# Patient Record
Sex: MALE | Race: WHITE | URBAN - NONMETROPOLITAN AREA
[De-identification: names, ages, dates, MRNs, and addresses within clinical notes are randomized per-mention and may not be internally consistent; named-entity substitution may affect disease eponyms.]

---

## 2018-03-09 ENCOUNTER — HOSPITAL ENCOUNTER (EMERGENCY)
Facility: HOSPITAL | Age: 73
Discharge: ANOTHER HOSPITAL | End: 2018-03-09
Attending: PHYSICIAN ASSISTANT | Admitting: PHYSICIAN ASSISTANT
Payer: COMMERCIAL

## 2018-03-09 ENCOUNTER — APPOINTMENT (OUTPATIENT)
Dept: CT IMAGING | Facility: HOSPITAL | Age: 73
End: 2018-03-09
Attending: PHYSICIAN ASSISTANT
Payer: COMMERCIAL

## 2018-03-09 ENCOUNTER — APPOINTMENT (OUTPATIENT)
Dept: GENERAL RADIOLOGY | Facility: HOSPITAL | Age: 73
End: 2018-03-09
Attending: PHYSICIAN ASSISTANT
Payer: COMMERCIAL

## 2018-03-09 VITALS
RESPIRATION RATE: 40 BRPM | DIASTOLIC BLOOD PRESSURE: 92 MMHG | OXYGEN SATURATION: 91 % | TEMPERATURE: 98.8 F | WEIGHT: 240 LBS | SYSTOLIC BLOOD PRESSURE: 117 MMHG

## 2018-03-09 DIAGNOSIS — J62.8 SILICOSIS (H): ICD-10-CM

## 2018-03-09 DIAGNOSIS — J96.01 ACUTE RESPIRATORY FAILURE WITH HYPOXIA (H): ICD-10-CM

## 2018-03-09 LAB
ALBUMIN SERPL-MCNC: 3.5 G/DL (ref 3.4–5)
ALP SERPL-CCNC: 109 U/L (ref 40–150)
ALT SERPL W P-5'-P-CCNC: 34 U/L (ref 0–70)
ANION GAP SERPL CALCULATED.3IONS-SCNC: 7 MMOL/L (ref 3–14)
AST SERPL W P-5'-P-CCNC: 33 U/L (ref 0–45)
BASE EXCESS BLDA CALC-SCNC: 0.9 MMOL/L
BASOPHILS # BLD AUTO: 0 10E9/L (ref 0–0.2)
BASOPHILS NFR BLD AUTO: 0.2 %
BILIRUB SERPL-MCNC: 0.3 MG/DL (ref 0.2–1.3)
BUN SERPL-MCNC: 15 MG/DL (ref 7–30)
CALCIUM SERPL-MCNC: 8.3 MG/DL (ref 8.5–10.1)
CHLORIDE SERPL-SCNC: 101 MMOL/L (ref 94–109)
CO2 SERPL-SCNC: 30 MMOL/L (ref 20–32)
CREAT SERPL-MCNC: 0.94 MG/DL (ref 0.66–1.25)
DIFFERENTIAL METHOD BLD: NORMAL
EOSINOPHIL # BLD AUTO: 0.2 10E9/L (ref 0–0.7)
EOSINOPHIL NFR BLD AUTO: 1.8 %
ERYTHROCYTE [DISTWIDTH] IN BLOOD BY AUTOMATED COUNT: 13.5 % (ref 10–15)
GFR SERPL CREATININE-BSD FRML MDRD: 78 ML/MIN/1.7M2
GLUCOSE SERPL-MCNC: 93 MG/DL (ref 70–99)
HCO3 BLD-SCNC: 25 MMOL/L (ref 21–28)
HCT VFR BLD AUTO: 43.5 % (ref 40–53)
HGB BLD-MCNC: 14.8 G/DL (ref 13.3–17.7)
IMM GRANULOCYTES # BLD: 0 10E9/L (ref 0–0.4)
IMM GRANULOCYTES NFR BLD: 0.5 %
LACTATE SERPL-SCNC: 1 MMOL/L (ref 0.4–2)
LYMPHOCYTES # BLD AUTO: 1.2 10E9/L (ref 0.8–5.3)
LYMPHOCYTES NFR BLD AUTO: 13.9 %
MCH RBC QN AUTO: 31.2 PG (ref 26.5–33)
MCHC RBC AUTO-ENTMCNC: 34 G/DL (ref 31.5–36.5)
MCV RBC AUTO: 92 FL (ref 78–100)
MONOCYTES # BLD AUTO: 0.9 10E9/L (ref 0–1.3)
MONOCYTES NFR BLD AUTO: 10.9 %
NEUTROPHILS # BLD AUTO: 6.2 10E9/L (ref 1.6–8.3)
NEUTROPHILS NFR BLD AUTO: 72.7 %
NRBC # BLD AUTO: 0 10*3/UL
NRBC BLD AUTO-RTO: 0 /100
NT-PROBNP SERPL-MCNC: 15 PG/ML (ref 0–900)
O2/TOTAL GAS SETTING VFR VENT: ABNORMAL %
OXYHGB MFR BLD: 94 % (ref 92–100)
PCO2 BLD: 39 MM HG (ref 35–45)
PH BLD: 7.42 PH (ref 7.35–7.45)
PLATELET # BLD AUTO: 249 10E9/L (ref 150–450)
PO2 BLD: 60 MM HG (ref 80–105)
POTASSIUM SERPL-SCNC: 3.8 MMOL/L (ref 3.4–5.3)
PROT SERPL-MCNC: 8.4 G/DL (ref 6.8–8.8)
RBC # BLD AUTO: 4.74 10E12/L (ref 4.4–5.9)
SODIUM SERPL-SCNC: 138 MMOL/L (ref 133–144)
TROPONIN I SERPL-MCNC: <0.015 UG/L (ref 0–0.04)
WBC # BLD AUTO: 8.5 10E9/L (ref 4–11)

## 2018-03-09 PROCEDURE — 99285 EMERGENCY DEPT VISIT HI MDM: CPT | Performed by: PHYSICIAN ASSISTANT

## 2018-03-09 PROCEDURE — 96375 TX/PRO/DX INJ NEW DRUG ADDON: CPT

## 2018-03-09 PROCEDURE — 96365 THER/PROPH/DIAG IV INF INIT: CPT

## 2018-03-09 PROCEDURE — 25000125 ZZHC RX 250: Performed by: PHYSICIAN ASSISTANT

## 2018-03-09 PROCEDURE — 84484 ASSAY OF TROPONIN QUANT: CPT | Performed by: PHYSICIAN ASSISTANT

## 2018-03-09 PROCEDURE — 36415 COLL VENOUS BLD VENIPUNCTURE: CPT | Performed by: PHYSICIAN ASSISTANT

## 2018-03-09 PROCEDURE — 83605 ASSAY OF LACTIC ACID: CPT | Performed by: PHYSICIAN ASSISTANT

## 2018-03-09 PROCEDURE — 82805 BLOOD GASES W/O2 SATURATION: CPT | Performed by: PHYSICIAN ASSISTANT

## 2018-03-09 PROCEDURE — 93010 ELECTROCARDIOGRAM REPORT: CPT | Performed by: INTERNAL MEDICINE

## 2018-03-09 PROCEDURE — 25000128 H RX IP 250 OP 636: Performed by: PHYSICIAN ASSISTANT

## 2018-03-09 PROCEDURE — 80053 COMPREHEN METABOLIC PANEL: CPT | Performed by: PHYSICIAN ASSISTANT

## 2018-03-09 PROCEDURE — 93005 ELECTROCARDIOGRAM TRACING: CPT

## 2018-03-09 PROCEDURE — 87040 BLOOD CULTURE FOR BACTERIA: CPT | Mod: 59 | Performed by: PHYSICIAN ASSISTANT

## 2018-03-09 PROCEDURE — 71045 X-RAY EXAM CHEST 1 VIEW: CPT | Mod: TC

## 2018-03-09 PROCEDURE — 94640 AIRWAY INHALATION TREATMENT: CPT

## 2018-03-09 PROCEDURE — 36600 WITHDRAWAL OF ARTERIAL BLOOD: CPT

## 2018-03-09 PROCEDURE — 71260 CT THORAX DX C+: CPT | Mod: TC

## 2018-03-09 PROCEDURE — 99285 EMERGENCY DEPT VISIT HI MDM: CPT | Mod: 25

## 2018-03-09 PROCEDURE — 85025 COMPLETE CBC W/AUTO DIFF WBC: CPT | Performed by: PHYSICIAN ASSISTANT

## 2018-03-09 PROCEDURE — 83880 ASSAY OF NATRIURETIC PEPTIDE: CPT | Performed by: PHYSICIAN ASSISTANT

## 2018-03-09 PROCEDURE — 40000275 ZZH STATISTIC RCP TIME EA 10 MIN

## 2018-03-09 RX ORDER — CEFTRIAXONE SODIUM 2 G/50ML
2 INJECTION, SOLUTION INTRAVENOUS ONCE
Status: COMPLETED | OUTPATIENT
Start: 2018-03-09 | End: 2018-03-09

## 2018-03-09 RX ORDER — METHYLPREDNISOLONE SODIUM SUCCINATE 125 MG/2ML
125 INJECTION, POWDER, LYOPHILIZED, FOR SOLUTION INTRAMUSCULAR; INTRAVENOUS ONCE
Status: COMPLETED | OUTPATIENT
Start: 2018-03-09 | End: 2018-03-09

## 2018-03-09 RX ORDER — LORAZEPAM 2 MG/ML
1 INJECTION INTRAMUSCULAR ONCE
Status: COMPLETED | OUTPATIENT
Start: 2018-03-09 | End: 2018-03-09

## 2018-03-09 RX ORDER — IOPAMIDOL 612 MG/ML
100 INJECTION, SOLUTION INTRAVASCULAR ONCE
Status: COMPLETED | OUTPATIENT
Start: 2018-03-09 | End: 2018-03-09

## 2018-03-09 RX ORDER — IPRATROPIUM BROMIDE AND ALBUTEROL SULFATE 2.5; .5 MG/3ML; MG/3ML
3 SOLUTION RESPIRATORY (INHALATION) ONCE
Status: COMPLETED | OUTPATIENT
Start: 2018-03-09 | End: 2018-03-09

## 2018-03-09 RX ORDER — FLUTICASONE PROPIONATE 50 MCG
2 SPRAY, SUSPENSION (ML) NASAL DAILY
COMMUNITY

## 2018-03-09 RX ORDER — LORATADINE 10 MG/1
10 TABLET ORAL DAILY
COMMUNITY

## 2018-03-09 RX ADMIN — CEFTRIAXONE SODIUM 2 G: 2 INJECTION, SOLUTION INTRAVENOUS at 17:49

## 2018-03-09 RX ADMIN — LORAZEPAM 1 MG: 2 INJECTION, SOLUTION INTRAMUSCULAR; INTRAVENOUS at 17:22

## 2018-03-09 RX ADMIN — AZITHROMYCIN MONOHYDRATE 500 MG: 500 INJECTION, POWDER, LYOPHILIZED, FOR SOLUTION INTRAVENOUS at 18:22

## 2018-03-09 RX ADMIN — METHYLPREDNISOLONE SODIUM SUCCINATE 125 MG: 125 INJECTION, POWDER, FOR SOLUTION INTRAMUSCULAR; INTRAVENOUS at 16:53

## 2018-03-09 RX ADMIN — IPRATROPIUM BROMIDE AND ALBUTEROL SULFATE 3 ML: .5; 3 SOLUTION RESPIRATORY (INHALATION) at 16:40

## 2018-03-09 RX ADMIN — IOPAMIDOL 100 ML: 612 INJECTION, SOLUTION INTRAVENOUS at 17:28

## 2018-03-09 ASSESSMENT — ENCOUNTER SYMPTOMS
COUGH: 1
NAUSEA: 0
WHEEZING: 1
ABDOMINAL PAIN: 0
SORE THROAT: 0
CHILLS: 0
CHEST TIGHTNESS: 0
SHORTNESS OF BREATH: 1
FEVER: 0
VOMITING: 0

## 2018-03-09 NOTE — ED NOTES
73 [facility] of breath and worsening cough. Patient states he has a chronic nonproductive cough, has had a productive cough with green sputum over last 3 days. Denies chest pain. Report [facility], states patient was 87% on RA, [facility]. No labs or imaging completed PTA.

## 2018-03-09 NOTE — ED NOTES
Patient back from CT.  Loud audible/coarse lung sounds noted.  Patient continues to struggle to breath; RR 32, sats low 90's on 4L oxygen increased to 5 L Sats 92-94.  HR noted to be elevated 120's.  Patient c/o feeling hot and shaking.  Temp recheck is 98.8.  Provider at bedside.

## 2018-03-09 NOTE — ED NOTES
Patient with noticeable SOB with talking and/or activity.  Oxygen 88-90 % on RA; oxygen placed 3L via NC sats improved to mid 90's.  Patient with audible coarse lung sounds noted anterior and clear lung sounds posterior.  Gown on, cardiac monitor, IV placed and labs drawn.  Rest of nursing assessment as charted.  Patient denies any pain.  States his breathing is 8/10.

## 2018-03-10 ENCOUNTER — TRANSFERRED RECORDS (OUTPATIENT)
Dept: HEALTH INFORMATION MANAGEMENT | Facility: CLINIC | Age: 73
End: 2018-03-10

## 2018-03-10 NOTE — ED NOTES
Patient continues to have resp. Distress.  Patient placed on 15L via non-rebreather.  Will continue to monitor.

## 2018-03-10 NOTE — ED PROVIDER NOTES
History     Chief Complaint   Patient presents with     Shortness of Breath     worsening over the last week     Cough     chronic cough, worsened over last 3 days - productive green sputum     HPI  Curtis Anglin is a 72 year old male who [facility] and productive cough x 1 week and hypoxia at 86% in the office. He was given a duoneb in the office with no improvement and sent here following. He has no known h/o pulmonary disease or cardiac disease. His wife mentions he has been very dyspneic with exertion since the beginning of the year and hasn't even had enough energy to go ice fishing this winter which is very unusual. The cough and dyspnea worsened substantially this week. He is a retired printer. Very distant smoking history. He is otherwise healthy.     Problem List:    There are no active problems to display for this patient.       Past Medical History:    No past medical history on file.    Past Surgical History:    No past surgical history on file.    Family History:    No family history on file.    Social History:  Marital Status:   [2]  Social History   Substance Use Topics     Smoking status: Not on file     Smokeless tobacco: Not on file     Alcohol use Not on file        Medications:      fluticasone (FLONASE) 50 MCG/ACT spray   loratadine (CLARITIN) 10 MG tablet   ALLOPURINOL PO         Review of Systems   Constitutional: Negative for chills and fever.   HENT: Negative for congestion and sore throat.    Respiratory: Positive for cough, shortness of breath and wheezing. Negative for chest tightness.    Cardiovascular: Negative for chest pain.   Gastrointestinal: Negative for abdominal pain, nausea and vomiting.   Skin: Negative.  Negative for rash.   All other systems reviewed and are negative.      Physical Exam   BP: 176/80  Heart Rate: 91  Temp: 97.6  F (36.4  C)  Resp: (!) 28  Weight: 108.9 kg (240 lb)  SpO2: (!) 89 %      Physical Exam    Constitutional: He is oriented to person, place, and time. He appears well-developed and well-nourished. He appears distressed.   HENT:   Head: Normocephalic and atraumatic.   Right Ear: External ear normal.   Left Ear: External ear normal.   Nose: Nose normal.   Mouth/Throat: Oropharynx is clear and moist. No oropharyngeal exudate.   Eyes: Conjunctivae and EOM are normal. Pupils are equal, round, and reactive to light. Right eye exhibits no discharge. Left eye exhibits no discharge. No scleral icterus.   Neck: Normal range of motion. Neck supple. No JVD present.   Cardiovascular: Normal rate, regular rhythm, normal heart sounds and intact distal pulses.  Exam reveals no gallop and no friction rub.    No murmur heard.  Pulmonary/Chest: Tachypnea noted. He is in respiratory distress. He has no wheezes. He has rhonchi (Coarse, throughout. ). He has no rales. He exhibits no tenderness.   Abdominal: There is no tenderness.   Musculoskeletal: Normal range of motion. He exhibits no edema.   Lymphadenopathy:     He has no cervical adenopathy.   Neurological: He is alert and oriented to person, place, and time. No cranial nerve deficit. Coordination normal.   Skin: Skin is warm and dry. No rash noted. He is not diaphoretic. No erythema. No pallor.   Psychiatric: His behavior is normal. Judgment and thought content normal. His mood appears anxious.   Nursing note and vitals reviewed.      ED Course     ED Course     Procedures          EKG: NSR without acute ST- T Changes.     Results for orders placed or performed during the hospital encounter of 03/09/18 (from the past 24 hour(s))   CBC with platelets differential   Result Value Ref Range    WBC 8.5 4.0 - 11.0 10e9/L    RBC Count 4.74 4.4 - 5.9 10e12/L    Hemoglobin 14.8 13.3 - 17.7 g/dL    Hematocrit 43.5 40.0 - 53.0 %    MCV 92 78 - 100 fl    MCH 31.2 26.5 - 33.0 pg    MCHC 34.0 31.5 - 36.5 g/dL    RDW 13.5 10.0 - 15.0 %    Platelet Count 249 150 - 450 10e9/L    Diff  Method Automated Method     % Neutrophils 72.7 %    % Lymphocytes 13.9 %    % Monocytes 10.9 %    % Eosinophils 1.8 %    % Basophils 0.2 %    % Immature Granulocytes 0.5 %    Nucleated RBCs 0 0 /100    Absolute Neutrophil 6.2 1.6 - 8.3 10e9/L    Absolute Lymphocytes 1.2 0.8 - 5.3 10e9/L    Absolute Monocytes 0.9 0.0 - 1.3 10e9/L    Absolute Eosinophils 0.2 0.0 - 0.7 10e9/L    Absolute Basophils 0.0 0.0 - 0.2 10e9/L    Abs Immature Granulocytes 0.0 0 - 0.4 10e9/L    Absolute Nucleated RBC 0.0    Comprehensive metabolic panel   Result Value Ref Range    Sodium 138 133 - 144 mmol/L    Potassium 3.8 3.4 - 5.3 mmol/L    Chloride 101 94 - 109 mmol/L    Carbon Dioxide 30 20 - 32 mmol/L    Anion Gap 7 3 - 14 mmol/L    Glucose 93 70 - 99 mg/dL    Urea Nitrogen 15 7 - 30 mg/dL    Creatinine 0.94 0.66 - 1.25 mg/dL    GFR Estimate 78 >60 mL/min/1.7m2    GFR Estimate If Black >90 >60 mL/min/1.7m2    Calcium 8.3 (L) 8.5 - 10.1 mg/dL    Bilirubin Total 0.3 0.2 - 1.3 mg/dL    Albumin 3.5 3.4 - 5.0 g/dL    Protein Total 8.4 6.8 - 8.8 g/dL    Alkaline Phosphatase 109 40 - 150 U/L    ALT 34 0 - 70 U/L    AST 33 0 - 45 U/L   Troponin I   Result Value Ref Range    Troponin I ES <0.015 0.000 - 0.045 ug/L   NT pro BNP   Result Value Ref Range    N-Terminal Pro BNP Inpatient 15 0 - 900 pg/mL   Lactic acid   Result Value Ref Range    Lactic Acid 1.0 0.4 - 2.0 mmol/L   Chest  XR, 1 view portable    Narrative    XR CHEST PORT 1 VW    HISTORY: 72 yearsMale dyspnea, CHF?;     TECHNIQUE: A single view of the chest was performed    COMPARISON: None    FINDINGS: There is diffuse bilateral interstitial opacity. Heart size  is normal to mildly prominent. Pulmonary vascular margins are  indistinct.      Impression    IMPRESSION: Extensive interstitial opacity. Suspect chronic  interstitial lung disease such as pulmonary fibrosis. Superimposed  pulmonary edema is possible.    HARLEEN DIOR MD   Blood gas arterial and oxyhgb   Result Value Ref  Range    pH Arterial 7.42 7.35 - 7.45 pH    pCO2 Arterial 39 35 - 45 mm Hg    pO2 Arterial 60 (L) 80 - 105 mm Hg    Bicarbonate Arterial 25 21 - 28 mmol/L    FIO2 32%     Oxyhemoglobin Arterial 94 92 - 100 %    Base Excess Art 0.9 mmol/L   CT Chest w Contrast    Narrative    CT CHEST W CONTRAST  3/9/2018 5:44 PM    CLINICAL HISTORY: Male, age 72 years,  dyspnea, pulmonary fibrosis vs  pulmonary edema; ;    Comparison:  Chest x-ray 3/9/2018    TECHNIQUE:  CT was performed of the chest  with IV contrast.   Sagittal, coronal and axial reconstructions were reviewed.     FINDINGS:  Chest CT:   Lungs : Chronic changes are seen throughout the lungs with central  scarring, numerous calcified lymph nodes and fibrotic changes  suggesting silicosis.    Thyroid: The visualized portions are normal.  Heart and Great Vessels:  Atherosclerotic calcifications.  Lymph Nodes:  Numerous enlarged and calcified lymph nodes.  Pleura: Nodular pleural thickening, most evident in the right upper  lung zone.  Bony Structures:  Scattered degenerative changes of the thoracic  spine.  Esophagus/stomach: Postoperative changes at the GE junction.    Visualized portions of the abdomen demonstrate no evidence of acute  abnormality      Impression    IMPRESSION:  1.   Chronic changes throughout the lungs suggesting silicosis.    2.  Mediastinal and hilar lymphadenopathy, the etiology of which is  unclear. There is a partially calcified nodule in the right upper lobe  measuring approximately 3 x 2.5 cm size and intensity appearance of a  scar however, neoplasm is not excluded.    ENEIDA MARTINO MD       Assessments & Plan (with Medical Decision Making)   Curtis presents with hypoxic respiratory failure and respiratory distress with tachypnea at 28 and SpO2 88%RA. He has coarse rhonchi throughout. Afebrile and VS are otherwise WNL. CXR shows extensive interstitial opacities so CT chest with IV contrast was performed for further evaluation. The CT shows  chronic changes throughout suggesting silicosis. It also shows mediastinal and hilar LAD and a partially calcified nodule in the right upper lobe, neoplasm not excluded. Cardiac work up was also performed and was normal with a negative troponin, no acute changes on EKG, BNP 15.  He was still tachypnic at 28 on 5 liters via NC following 2 duonebs and solumedrol 125mg IV, so he was switched to a non-rebreather at 15 liters. This, combined with Ativan 1mg IV has slightly improved his respiratory distress and appears more relaxed. His respiratory rate decreased to 24 following and SpO2 increased from 90 to 95. He is able to speak in 3 word sentences. He was given Rocephin 2g IV and Azithromycin 500mg IV to cover for bacterial infection, following BC x 2.  He would benefit from a transfer to [place] for a pulmonology consult given the silicosis is a new diagnosis.   I spoke with Dr. Matute, intake physician at [facility], who has kindly accepted pt for care.  He was transferred to [facility] via ALS ambulance in stable condition. Currently weaned down to 6 liters vis NC.      I have reviewed the nursing notes.    I have reviewed the findings, diagnosis, plan and need for follow up with the patient.    New Prescriptions    No medications on file       Final diagnoses:   Acute respiratory failure with hypoxia (H)   Silicosis (H)       3/9/2018   [facility]     Jennie Armstrong PA-C  03/09/18 1936

## 2018-03-10 NOTE — ED NOTES
Oxygen is turned down to 5 liters per nasal cannula per provider and transfer receiving provider.

## 2018-03-10 NOTE — ED NOTES
Transfer crew is here and report is given to them . Patient is then able to stand and moves from bed to cot and is secured with straps. Patient is then taken to ambulance by crew. 1 bag of belongings including his shirt and coat are sent with wife. She states she will be going tomorrow morning. Patient is then discharged with transfer crew to [facility] in [place].

## 2018-03-15 LAB
BACTERIA SPEC CULT: NORMAL
BACTERIA SPEC CULT: NORMAL
SPECIMEN SOURCE: NORMAL
SPECIMEN SOURCE: NORMAL

## 2018-05-18 ENCOUNTER — OFFICE VISIT (OUTPATIENT)
Dept: SLEEP MEDICINE | Facility: HOSPITAL | Age: 73
End: 2018-05-18
Payer: MEDICARE

## 2018-05-18 DIAGNOSIS — R09.02 HYPOXIA: Primary | ICD-10-CM

## 2018-05-18 NOTE — NURSING NOTE
Patient picked up over night oximeter number SL-1. Patient was instructed on use of device. Patient verbalized understanding.     Patient will return device tomorrow by:patient will return to [facility] on Sunday night in [place].

## 2018-05-18 NOTE — MR AVS SNAPSHOT
"              After Visit Summary   2018    Curtis Anglin    MRN: [id number]           Patient Information     Date Of Birth          1945        Visit Information        Provider Department      2018 9:00 AM [facility]        Today's Diagnoses     Hypoxia    -  1       Follow-ups after your visit        Who to contact     If you have questions or need follow up information about today's clinic visit or your schedule please contact [facility] directly at [phone].  Normal or non-critical lab and imaging results will be communicated to you by [employer], letter or phone within 4 business days after the clinic has received the results. If you do not hear from us within 7 days, please contact the clinic through [employer] or phone. If you have a critical or abnormal lab result, we will notify you by phone as soon as possible.  Submit refill requests through [employer] or call your pharmacy and they will forward the refill request to us. Please allow 3 business days for your refill to be completed.          Additional Information About Your Visit        Skyonichar[employer] Information     [employer]ster lets you send messages to your doctor, view your test results, renew your prescriptions, schedule appointments and more. To sign up, go to [email or web address] . Click on \"Log in\" on the left side of the screen, which will take you to the Welcome page. Then click on \"Sign up Now\" on the right side of the page.     You will be asked to enter the access code listed below, as well as some personal information. Please follow the directions to create your username and password.     Your access code is: [email or web address]  Expires: 2018 10:36 AM     Your access code will  in 90 days. If you need help or a new code, please call your [facility] or [phone].        Care EveryWhere ID     This is your Care EveryWhere ID. This could be used by other organizations to access your [facility] " records  [id number]         Blood Pressure from Last 3 Encounters:   03/09/18 117/92    Weight from Last 3 Encounters:   03/09/18 240 lb (108.9 kg)              Today, you had the following     No orders found for display       Primary Care Provider Office Phone # Fax #    [facility] [place] [facility] [phone]       [address] [address]  [place] [address]        Equal Access to Services     SHERMAN NARANJO : Hadii aad ku hadasho Soomaali, waaxda luqadaha, qaybta kaalmada adeegyada, waxay idiin hayaan adeeg darrionsh laemily . So [facility] [phone].    ATENCIÓN: Si habla espsteve, tiene a acevedo disposición servicios gratuitos de asistencia lingüística. Mattame al [phone].    We comply with applicable federal civil rights laws and [place] laws. We do not discriminate on the basis of race, color, national origin, age, disability, sex, sexual orientation, or gender identity.            Thank you!     Thank you for choosing [facility]  for your care. Our goal is always to provide you with excellent care. Hearing back from our patients is one way we can continue to improve our services. Please take a few minutes to complete the written survey that you may receive in the mail after your visit with us. Thank you!             Your Updated Medication List - Protect others around you: Learn how to safely use, store and throw away your medicines at www.disposemymeds.org.          This list is accurate as of 5/18/18 11:59 PM.  Always use your most recent med list.                   Brand Name Dispense Instructions for use Diagnosis    ALLOPURINOL PO           fluticasone 50 MCG/ACT spray    FLONASE     Spray 2 sprays into both nostrils daily        loratadine 10 MG tablet    CLARITIN     Take 10 mg by mouth daily

## 2018-11-06 ENCOUNTER — TELEPHONE (OUTPATIENT)
Dept: SLEEP MEDICINE | Facility: HOSPITAL | Age: 73
End: 2018-11-06

## 2018-11-06 DIAGNOSIS — J96.11 CHRONIC HYPOXEMIC RESPIRATORY FAILURE (H): Primary | ICD-10-CM
